# Patient Record
Sex: FEMALE | Race: WHITE | NOT HISPANIC OR LATINO | Employment: UNEMPLOYED | URBAN - NONMETROPOLITAN AREA
[De-identification: names, ages, dates, MRNs, and addresses within clinical notes are randomized per-mention and may not be internally consistent; named-entity substitution may affect disease eponyms.]

---

## 2018-07-25 ENCOUNTER — HISTORICAL (OUTPATIENT)
Dept: ADMINISTRATIVE | Facility: HOSPITAL | Age: 28
End: 2018-07-25

## 2021-12-21 LAB
B-HCG FREE SERPL-ACNC: NORMAL MIU/ML
BILIRUB SERPL-MCNC: NEGATIVE MG/DL
BLOOD URINE, POC: NORMAL
CLARITY, POC UA: CLEAR
COLOR, POC UA: YELLOW
GLUCOSE UR QL STRIP: NEGATIVE
KETONES UR QL STRIP: NEGATIVE
LEUKOCYTE EST, POC UA: NEGATIVE
NITRITE, POC UA: NEGATIVE
PH, POC UA: 6
POC BETA-HCG (QUAL): POSITIVE
PROTEIN, POC: NEGATIVE
SPECIFIC GRAVITY, POC UA: NORMAL
UROBILINOGEN, POC UA: NORMAL

## 2021-12-23 LAB — B-HCG FREE SERPL-ACNC: NORMAL MIU/ML

## 2021-12-27 LAB
CLARITY, POC UA: CLEAR
COLOR, POC UA: YELLOW
GLUCOSE UR QL STRIP: NEGATIVE
LEUKOCYTE EST, POC UA: NEGATIVE
NITRITE, POC UA: NEGATIVE
PROTEIN, POC: NEGATIVE

## 2021-12-28 LAB
HIV 1+2 AB+HIV1 P24 AG SERPL QL IA: NORMAL
RPR SER QL: NORMAL

## 2022-04-10 ENCOUNTER — HISTORICAL (OUTPATIENT)
Dept: ADMINISTRATIVE | Facility: HOSPITAL | Age: 32
End: 2022-04-10

## 2022-04-26 VITALS
WEIGHT: 157.63 LBS | SYSTOLIC BLOOD PRESSURE: 118 MMHG | DIASTOLIC BLOOD PRESSURE: 70 MMHG | HEIGHT: 65 IN | BODY MASS INDEX: 26.26 KG/M2

## 2022-04-30 ENCOUNTER — HISTORICAL (OUTPATIENT)
Dept: ADMINISTRATIVE | Facility: HOSPITAL | Age: 32
End: 2022-04-30

## 2022-05-03 NOTE — HISTORICAL [facility] CERNER
This is a historical note converted from Naheed. Formatting and pictures may have been removed.  Please reference Naheed for original formatting and attached multimedia. Chief Complaint  new ob  History of Present Illness  30yo WF  at 12w5d by LMP of 2021.  Last pap: 21 WNL. Reports mild to moderate back pain x1 week. Denies bleeding, pain.  LMP/EGA/REY  Gestational Age (EGA) and REY?? ? * Note: EGA calculated as of 2021  ?  REY:?2022???EGA*:?12 weeks 5 days ? ? ? ? ? ?Type:?Authoritative??????Method Date:?2021  ?  ?? ? ?Method:?Last Menstrual Period?(2021)  ?? ? ?Confirmation:?Confirmed  ?? ? ?Description:?--  ?? ? ?Comments:?--  ?? ? ?Entered by:?Dariela Spencer on 2021?  ?  Other REY Calculations for this Pregnancy:  ?? ? ?No additional REY calculations have been recorded for this pregnancy  Gynecological History  Last Menstrual Period: 21  Date of Last Pap Smear: 21  Menstrual Status Intake: Menarcheal  Age of Menarche: 13  STIs/STDs: No  Intermenstrual Bleeding: No  Abnormal Pap: No  Current Birth Control Method: BCPs  Dysmenorrhea: Moderate  HPV Vaccine: No  Flow: Moderate  Dyspareunia: No  Duration of Flow: 5  Postcoital Bleeding: No  Frequency of Cycle: 28  Dysuria: No  Additional GYN Information: Last pap: ASCUS (-) hpv.  Discharge OB: none  Breast CA Hx: No  Sexual Problems OB: none  Urinary Incontinence: none  Sexually Active: Yes  Review of Systems  General/Constitutional:  Fever?denies?.?  Skin:  Rash?denies.  Respiratory:  Cough?denies?. SOB?denies?. Wheezing?denies?.  Cardiovascular:  Chest pain?denies?. Dizziness?denies?.?Palpitations?denies?. Swelling in hands/feet?denies?.?  Gastrointestinal:  Abdominal pain?denies?. Constipation?denies?. Diarrhea?denies?. Heartburn?denies?. Nausea?denies?. Vomiting?denies?  Genitourinary:  Painful urination?denies.  Gynecologic:  Vaginal bleeding?denies?. Vaginal discharge?Normal. Leaking amniotic  fluid?denies. ?Contractions?denies?  Psychiatric:  Depressed mood?denies. Suicidal thoughts?denies.?  Physical Exam  Vitals & Measurements  T:?36.3? ?C (Temporal Artery)? BP:?118/68?  HT:?165.00?cm? WT:?74.400?kg? BMI:?27.33? LMP:?2021 00:00 CDT?  General Exam:  ?  General Appearance:?alert, in no acute distress, normal, well nourished.  Psych:  Orientation:?time, place, person.  Mood/Affect:?Normal?  Abdomen:  ?-?Soft.?Non-tender. ?No rebound tenderness or guardingNo masses. ? Liver?normal. ?Spleen?normal. ?No hernia palpable.  Pelvis:?  ?- Vulva:?Normal?  ?- Perianal skin:?Normal?  ?- Urethra:?Normal meatus. Q-tip:?Not performed?  ?- Vagina: ?NormalVaginal discharge present:?.?Cystocele:?Absent. ?Rectocele:?Absent?  ?- Cervix:?Normal. Cervical motion tenderness?Absent?  ?- Uterus: Size -?NormalMobile.?Non-tender.?  ?- Adnexal:?Normal  ?  TVUS:  GS: 11w1d  No YS?present  No FP present  Assessment/Plan  1.?Anembryonic pregnancy?O02.0  2.?11 weeks gestation of pregnancy?Z3A.11  Discussed findings of pelvic ultrasound today.  Beta HCG today, repeat 48 hours  Strict ER, Pain, Infection precautions given  ?  RTC Monday for Rescan   OB History  Pregnancy History???(1,0,0,1)?? ??  Pregnancy # 1  Baby 1?????????????Outcome Date:?2018????? Outcome:?Live Birth  ???Outcome or Result:?Vaginal  ???Gender:?Female????????Gest Age:?39 weeks 4 days ??????Wt:??3487 g  ???Hospital:?[facility]????????Rich Labor:?--  ???Spring Name:?--?????Babys Father:?--  ???Anesthesia Type:?Epidural  ??? Labor:?No  Problem List/Past Medical History  Ongoing  Anembryonic pregnancy  Pregnant  Historical  Pregnant  Procedure/Surgical History  Cystitis, interstitial   Medications  Blisovi FE 1/20 oral tablet, 1 tab(s), Oral, Daily,? ?Not taking  Blisovi FE 1/20 oral tablet, 1 tab(s), Oral, Daily, 3 refills,? 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 Highest education level: University degree(s)., 01/07/2021  Sexual  Sexually active: Yes. Number of current partners 1. Sexual orientation: Straight or heterosexual. Gender Identity Identifies as female. No, Other sexual concerns: No hx of STI., 12/21/2021  Substance Use  Never, 07/22/2020  Tobacco  Never (less than 100 in lifetime), N/A, 12/21/2021  Family History  Family history is negative  Immunizations  Vaccine Date Status   COVID-19 mRNA, LNP-S, PF - Moderna 08/23/2021 Recorded   meningococcal conjugate vaccine 05/07/2009 Recorded   influenza virus vaccine, inactivated 11/13/2007 Recorded   hepatitis B pediatric vaccine 10/10/2005 Recorded   hepatitis B pediatric vaccine 08/04/2005 Recorded   hepatitis B pediatric vaccine 06/13/2005 Recorded   poliovirus vaccine, inactivated 05/20/1996 Recorded   measles/mumps/rubella virus vaccine 05/20/1996 Recorded   diphtheria/pertussis, acel/tetanus ped 05/20/1996 Recorded   poliovirus vaccine, inactivated 01/25/1993 Recorded   measles/mumps/rubella virus vaccine 01/25/1993 Recorded   diphtheria/pertussis, acel/tetanus ped 01/25/1993 Recorded   diphtheria/pertussis, acel/tetanus ped 05/04/1992 Recorded   poliovirus vaccine, inactivated 02/17/1992 Recorded   diphtheria/pertussis, acel/tetanus ped 02/17/1992 Recorded   poliovirus vaccine, inactivated 1991 Recorded   diphtheria/pertussis, acel/tetanus ped 1991 Recorded   Health Maintenance  Health Maintenance  ???Pending?(in the next year)  ??? ??OverDue  ??? ? ? ?Alcohol Misuse Screening due??01/02/21??and every 1??year(s)  ??? ??Due?  ??? ? ? ?ADL Screening due??12/21/21??and every 1??year(s)  ??? ? ? ?Tetanus Vaccine due??12/21/21??and every 10??year(s)  ??? ??Due In Future?  ??? ? ? ?Obesity Screening not due  until??01/01/22??and every 1??year(s)  ???Satisfied?(in the past 1 year)  ??? ??Satisfied?  ??? ? ? ?Blood Pressure Screening on??12/21/21.??Satisfied by Dariela Spencer  ??? ? ? ?Body Mass Index Check on??12/21/21.??Satisfied by Dariela Spencer  ??? ? ? ?Cervical Cancer Screening on??01/07/21.??Satisfied by Brittaney Keith  ??? ? ? ?Depression Screening on??12/21/21.??Satisfied by Dariela Spencer  ??? ? ? ?Influenza Vaccine on??12/21/21.??Satisfied by Dariela Spencer  ??? ? ? ?Obesity Screening on??12/21/21.??Satisfied by Dariela Spencer  ?

## 2022-06-22 LAB
BENZODIAZEPINES: NORMAL
C TRACH DNA SPEC QL NAA+PROBE: NEGATIVE
CLARITHRO TITR SBT: NORMAL {TITER}
COCAINE (METAB.): NORMAL
MARIJUANA (THC) METABOLITE: NORMAL
METHADONE: NORMAL
N GONORRHOEA DNA SPEC QL NAA+PROBE: NORMAL
OPIATES UR QL SCN: NORMAL
URINE CULTURE, ROUTINE: NEGATIVE

## 2022-07-06 LAB
ABO + RH BLD: NORMAL
EXT MATERNIT21: NORMAL
HBV SURFACE AG SERPL QL IA: NONREACTIVE
HCT VFR BLD AUTO: 33 % (ref 36–46)
HCV AB SERPL QL IA: NEGATIVE
HGB BLD-MCNC: 11 G/DL (ref 12–16)
HIV-1 AND HIV-2 ANTIBODIES: NONREACTIVE
INDIRECT COOMBS: NEGATIVE
PLATELET # BLD AUTO: 243 K/UL (ref 150–399)
RUBELLA IMMUNE STATUS: NORMAL
T PALLIDUM AB SER QL: NONREACTIVE

## 2022-08-24 LAB — QUAD SCREEN: NORMAL

## 2022-09-16 ENCOUNTER — HISTORICAL (OUTPATIENT)
Dept: ADMINISTRATIVE | Facility: HOSPITAL | Age: 32
End: 2022-09-16

## 2022-11-18 LAB
GLUCOSE SERPL-MCNC: 149 MG/DL
HCT VFR BLD AUTO: 32.9 % (ref 36–46)
HGB BLD-MCNC: 10.9 G/DL (ref 12–16)
MCV RBC AUTO: 92.7 FL (ref 82–108)
PLATELET # BLD AUTO: 221 K/UL (ref 150–399)
T PALLIDUM AB SER QL: NONREACTIVE

## 2022-11-23 LAB
GLUCOSE, 1 HOUR: 129 MG/DL
GLUCOSE, 2 HOUR: 97 MG/DL
GLUCOSE, 3 HOUR: 95 MG/DL
GLUCOSE, FASTING: 82 MG/DL

## 2023-01-03 LAB — STREP B PCR (OHS): NOT DETECTED

## 2023-01-18 VITALS
BODY MASS INDEX: 31.96 KG/M2 | WEIGHT: 191.81 LBS | DIASTOLIC BLOOD PRESSURE: 68 MMHG | SYSTOLIC BLOOD PRESSURE: 100 MMHG

## 2023-01-18 DIAGNOSIS — Z34.90 PREGNANCY, UNSPECIFIED GESTATIONAL AGE: ICD-10-CM

## 2023-01-18 DIAGNOSIS — Z34.90 NORMAL INTRAUTERINE PREGNANCY, ANTEPARTUM: ICD-10-CM

## 2023-01-18 LAB — PRENATAL STREP B CULTURE: NEGATIVE

## 2023-01-24 ENCOUNTER — DOCUMENTATION ONLY (OUTPATIENT)
Dept: OBSTETRICS AND GYNECOLOGY | Facility: CLINIC | Age: 33
End: 2023-01-24

## 2023-01-24 ENCOUNTER — ROUTINE PRENATAL (OUTPATIENT)
Dept: OBSTETRICS AND GYNECOLOGY | Facility: CLINIC | Age: 33
End: 2023-01-24
Payer: COMMERCIAL

## 2023-01-24 VITALS
HEART RATE: 82 BPM | DIASTOLIC BLOOD PRESSURE: 64 MMHG | BODY MASS INDEX: 32.8 KG/M2 | WEIGHT: 196.88 LBS | SYSTOLIC BLOOD PRESSURE: 112 MMHG

## 2023-01-24 DIAGNOSIS — Z34.90 PREGNANCY, UNSPECIFIED GESTATIONAL AGE: Primary | ICD-10-CM

## 2023-01-24 LAB
BILIRUB SERPL-MCNC: NEGATIVE MG/DL
BLOOD URINE, POC: NORMAL
CLARITY, POC UA: CLEAR
COLOR, POC UA: NORMAL
GLUCOSE UR QL STRIP: NEGATIVE
KETONES UR QL STRIP: NORMAL
LEUKOCYTE ESTERASE URINE, POC: NORMAL
NITRITE, POC UA: NEGATIVE
PH, POC UA: 7
PROTEIN, POC: NEGATIVE
SPECIFIC GRAVITY, POC UA: 1.02
URINE CULTURE: NO GROWTH
UROBILINOGEN, POC UA: 0.2

## 2023-01-24 PROCEDURE — 99499 NO LOS: ICD-10-PCS | Mod: ,,, | Performed by: OBSTETRICS & GYNECOLOGY

## 2023-01-24 PROCEDURE — 99499 UNLISTED E&M SERVICE: CPT | Mod: ,,, | Performed by: OBSTETRICS & GYNECOLOGY

## 2023-01-24 NOTE — PROGRESS NOTES
PRESENTS TO CLINIC 39 WEEKS FOR OB VISIT. C/O INCREASE PELVIC PRESSURE AND IRREGULAR CONTRACTIONS

## 2023-01-24 NOTE — PROGRESS NOTES
HPI  Hayde Mejía is a 32 y.o.   40w2d here for Routine Prenatal Visit (39 weeks for OB visit)  C/O PELVIC PRESSURE, NO VB, ROM, OR PIH SXS. ACTIVE CROW Rosado allergies, medications, history, and problem list were updated as appropriate.    ROS:  A comprehensive review of symptoms was completed and negative except as noted above.    Physical Exam:  /64   Pulse 82   Wt 89.3 kg (196 lb 13.9 oz)   LMP 2022   BMI 32.80 kg/m²     Gen: No distress  Abdomen: Gravid, non-tender  Extremities: No edema    Fetal Heart Rate: 126  Movement: Present  Presentation: Vertex  Dilation: 3.5  Effacement (%): 70  Station: -2    ASSESSMENT/PLAN:  1. Pregnancy, unspecified gestational age  -     POCT URINE DIPSTICK WITHOUT MICROSCOPE    RTC IN 2 DAYS FOR F/U     Labor unit, Kick Counts, and Pre-eclampsia given    Follow Up:  Follow up in about 2 days (around 2023) for OB F/U.

## 2023-01-26 ENCOUNTER — PROCEDURE VISIT (OUTPATIENT)
Dept: OBSTETRICS AND GYNECOLOGY | Facility: CLINIC | Age: 33
End: 2023-01-26
Payer: COMMERCIAL

## 2023-01-26 VITALS
HEART RATE: 104 BPM | HEIGHT: 65 IN | SYSTOLIC BLOOD PRESSURE: 108 MMHG | DIASTOLIC BLOOD PRESSURE: 78 MMHG | RESPIRATION RATE: 18 BRPM | WEIGHT: 199.06 LBS | BODY MASS INDEX: 33.16 KG/M2

## 2023-01-26 DIAGNOSIS — Z34.83 ENCOUNTER FOR SUPERVISION OF OTHER NORMAL PREGNANCY IN THIRD TRIMESTER: Primary | ICD-10-CM

## 2023-01-26 DIAGNOSIS — Z34.90 PREGNANCY, UNSPECIFIED GESTATIONAL AGE: ICD-10-CM

## 2023-01-26 LAB
BILIRUB SERPL-MCNC: NEGATIVE MG/DL
BLOOD URINE, POC: NORMAL
CLARITY, POC UA: CLEAR
COLOR, POC UA: YELLOW
GLUCOSE UR QL STRIP: NEGATIVE
KETONES UR QL STRIP: NEGATIVE
LEUKOCYTE ESTERASE URINE, POC: NORMAL
NITRITE, POC UA: NEGATIVE
PH, POC UA: 7
PROTEIN, POC: NEGATIVE
SPECIFIC GRAVITY, POC UA: 1.02
UROBILINOGEN, POC UA: 0.2

## 2023-01-26 PROCEDURE — 0502F SUBSEQUENT PRENATAL CARE: CPT | Mod: ,,, | Performed by: OBSTETRICS & GYNECOLOGY

## 2023-01-26 PROCEDURE — 0502F PR SUBSEQUENT PRENATAL CARE: ICD-10-PCS | Mod: ,,, | Performed by: OBSTETRICS & GYNECOLOGY

## 2023-01-26 NOTE — H&P (VIEW-ONLY)
"HPI  Hayde Mejía is a 32 y.o.   39w2d here for Routine Prenatal Visit (Presents to clinic 39w2d, no c/o)  HAVING SOME PRESSURE AND IRREGULAR CONTRACTIONS. DENIES VB, ROM, PIH SXS. REPORTS ACTIVE FM.      Hayde's allergies, medications, history, and problem list were updated as appropriate.    ROS:  A comprehensive review of symptoms was completed and negative except as noted above.    Physical Exam:  /78   Pulse 104   Resp 18   Ht 5' 5" (1.651 m)   Wt 90.3 kg (199 lb 1.2 oz)   LMP 2022   BMI 33.13 kg/m²     Gen: No distress  Abdomen: Gravid, non-tender  Extremities: No edema  PELVIC: 4.5/80/-2/VTX/OP    U/S: 37W6D, EFW=6#9OZ, SHAKILA=12CM, VTX, OP    ASSESSMENT/PLAN:  1. Encounter for supervision of other normal pregnancy in third trimester    2. Pregnancy, unspecified gestational age  -     POCT URINE DIPSTICK WITHOUT MICROSCOPE  -     Cancel: US OB/GYN In Clinic Procedure (Non Viewpoint)- Today  -     Cancel: US OB/GYN Procedure (Viewpoint) - Extended List - Future    ADMIT FOR LABOR [facility]ATATION AT [facility] ON 23 AT 8AM.      Recent Results (from the past 24 hour(s))   POCT URINE DIPSTICK WITHOUT MICROSCOPE    Collection Time: 23 10:27 AM   Result Value Ref Range    Color, UA Yellow     pH, UA 7.0     WBC, UA SMALL     Nitrite, UA NEGATIVE     Protein, POC NEGATIVE     Glucose, UA NEGATIVE     Ketones, UA NEGATIVE     Urobilinogen, UA 0.2     Bilirubin, POC NEGATIVE     Blood, UA TRACE     Clarity, UA Clear     Spec Grav UA 1.020        Labor unit, Kick Counts, and Pre-eclampsia given    Follow Up:  Follow up in about 4 weeks (around 2023) for POST PARTUM F/U.       "

## 2023-01-26 NOTE — PROGRESS NOTES
"HPI  Hayde Mejía is a 32 y.o.   39w2d here for Routine Prenatal Visit (Presents to clinic 39w2d, no c/o)  HAVING SOME PRESSURE AND IRREGULAR CONTRACTIONS. DENIES VB, ROM, PIH SXS. REPORTS ACTIVE FM.      Hayde's allergies, medications, history, and problem list were updated as appropriate.    ROS:  A comprehensive review of symptoms was completed and negative except as noted above.    Physical Exam:  /78   Pulse 104   Resp 18   Ht 5' 5" (1.651 m)   Wt 90.3 kg (199 lb 1.2 oz)   LMP 2022   BMI 33.13 kg/m²     Gen: No distress  Abdomen: Gravid, non-tender  Extremities: No edema  PELVIC: 4.5/80/-2/VTX/OP    U/S: 37W6D, EFW=6#9OZ, SHAKILA=12CM, VTX, OP    ASSESSMENT/PLAN:  1. Encounter for supervision of other normal pregnancy in third trimester    2. Pregnancy, unspecified gestational age  -     POCT URINE DIPSTICK WITHOUT MICROSCOPE  -     Cancel: US OB/GYN In Clinic Procedure (Non Viewpoint)- Today  -     Cancel: US OB/GYN Procedure (Viewpoint) - Extended List - Future    ADMIT FOR LABOR AUGMENATATION AT [facility] ON 23 AT 8AM.      Recent Results (from the past 24 hour(s))   POCT URINE DIPSTICK WITHOUT MICROSCOPE    Collection Time: 23 10:27 AM   Result Value Ref Range    Color, UA Yellow     pH, UA 7.0     WBC, UA SMALL     Nitrite, UA NEGATIVE     Protein, POC NEGATIVE     Glucose, UA NEGATIVE     Ketones, UA NEGATIVE     Urobilinogen, UA 0.2     Bilirubin, POC NEGATIVE     Blood, UA TRACE     Clarity, UA Clear     Spec Grav UA 1.020        Labor unit, Kick Counts, and Pre-eclampsia given    Follow Up:  Follow up in about 4 weeks (around 2023) for POST PARTUM F/U.       "

## 2023-01-27 ENCOUNTER — HOSPITAL ENCOUNTER (INPATIENT)
Facility: HOSPITAL | Age: 33
LOS: 1 days | Discharge: HOME | End: 2023-01-28
Attending: OBSTETRICS & GYNECOLOGY | Admitting: OBSTETRICS & GYNECOLOGY
Payer: COMMERCIAL

## 2023-01-27 ENCOUNTER — ANESTHESIA (OUTPATIENT)
Dept: OBSTETRICS AND GYNECOLOGY | Facility: HOSPITAL | Age: 33
End: 2023-01-27
Payer: COMMERCIAL

## 2023-01-27 ENCOUNTER — ANESTHESIA EVENT (OUTPATIENT)
Dept: OBSTETRICS AND GYNECOLOGY | Facility: HOSPITAL | Age: 33
End: 2023-01-27
Payer: COMMERCIAL

## 2023-01-27 DIAGNOSIS — Z34.90 NORMAL PREGNANCY: ICD-10-CM

## 2023-01-27 DIAGNOSIS — Z34.93 NORMAL PREGNANCY IN THIRD TRIMESTER: ICD-10-CM

## 2023-01-27 LAB
ABO AND RH: NORMAL
ABORH RETYPE: NORMAL
ALBUMIN SERPL-MCNC: 3.2 G/DL (ref 3.4–5)
ALBUMIN/GLOB SERPL: 1.1 RATIO
ALP SERPL-CCNC: 120 UNIT/L (ref 50–144)
ALT SERPL-CCNC: 16 UNIT/L (ref 1–45)
ANTIBODY SCREEN: NORMAL
AST SERPL-CCNC: 23 UNIT/L (ref 14–36)
BASOPHILS # BLD AUTO: 0.01 X10(3)/MCL (ref 0.01–0.08)
BASOPHILS NFR BLD AUTO: 0.1 % (ref 0.1–1.2)
BILIRUBIN DIRECT+TOT PNL SERPL-MCNC: 0.3 MG/DL (ref 0–1)
BUN SERPL-MCNC: 7 MG/DL (ref 7–20)
CALCIUM SERPL-MCNC: 8.8 MG/DL (ref 8.4–10.2)
CHLORIDE SERPL-SCNC: 107 MMOL/L (ref 98–110)
CO2 SERPL-SCNC: 23 MMOL/L (ref 21–32)
CREAT SERPL-MCNC: 0.48 MG/DL (ref 0.66–1.25)
EOSINOPHIL # BLD AUTO: 0.04 X10(3)/MCL (ref 0.04–0.36)
EOSINOPHIL NFR BLD AUTO: 0.6 % (ref 0.7–7)
ERYTHROCYTE [DISTWIDTH] IN BLOOD BY AUTOMATED COUNT: 12.3 % (ref 11–14.5)
GFR SERPLBLD CREATININE-BSD FMLA CKD-EPI: >90 MLS/MIN/1.73/M2
GLOBULIN SER-MCNC: 2.9 GM/DL (ref 2–3.9)
GLUCOSE SERPL-MCNC: 86 MG/DL (ref 70–115)
HBV SURFACE AG SERPL QL IA: NEGATIVE
HCT VFR BLD AUTO: 31.6 % (ref 36–48)
HGB BLD-MCNC: 10.5 GM/DL (ref 11.8–16)
HIV 1+2 AB+HIV1 P24 AG SERPL QL IA: NONREACTIVE
IMM GRANULOCYTES # BLD AUTO: 0.05 X10(3)/MCL (ref 0–0.03)
IMM GRANULOCYTES NFR BLD AUTO: 0.7 % (ref 0–0.5)
LYMPHOCYTES # BLD AUTO: 1.44 X10(3)/MCL (ref 1.16–3.74)
LYMPHOCYTES NFR BLD AUTO: 20.4 % (ref 20–55)
MCH RBC QN AUTO: 29.4 PG (ref 27–34)
MCV RBC AUTO: 88.5 FL (ref 79–99)
MEAN CELL HEMOGLOBIN CONCENTRATION (OHS) G/DL: 33.2 G/DL (ref 31–37)
MONOCYTES # BLD AUTO: 0.48 X10(3)/MCL (ref 0.24–0.36)
MONOCYTES NFR BLD AUTO: 6.8 % (ref 4.7–12.5)
NEUTROPHILS # BLD AUTO: 5.03 X10(3)/MCL (ref 1.56–6.13)
NEUTROPHILS NFR BLD AUTO: 71.4 % (ref 37–73)
NRBC BLD AUTO-RTO: 0 % (ref 0–1)
PLATELET # BLD AUTO: 191 X10(3)/MCL (ref 140–371)
PMV BLD AUTO: 10.1 FL (ref 9.4–12.4)
POTASSIUM SERPL-SCNC: 3.6 MMOL/L (ref 3.5–5.1)
PROT SERPL-MCNC: 6.1 GM/DL (ref 6.3–8.2)
RBC # BLD AUTO: 3.57 X10(6)/MCL (ref 4–5.1)
SODIUM SERPL-SCNC: 136 MMOL/L (ref 135–145)
T PALLIDUM AB SER QL: NONREACTIVE
WBC # SPEC AUTO: 7.1 X10(3)/MCL (ref 4–11.5)

## 2023-01-27 PROCEDURE — 62326 NJX INTERLAMINAR LMBR/SAC: CPT | Performed by: NURSE ANESTHETIST, CERTIFIED REGISTERED

## 2023-01-27 PROCEDURE — 87389 HIV-1 AG W/HIV-1&-2 AB AG IA: CPT | Performed by: OBSTETRICS & GYNECOLOGY

## 2023-01-27 PROCEDURE — 25000003 PHARM REV CODE 250: Performed by: NURSE ANESTHETIST, CERTIFIED REGISTERED

## 2023-01-27 PROCEDURE — 36415 COLL VENOUS BLD VENIPUNCTURE: CPT | Performed by: OBSTETRICS & GYNECOLOGY

## 2023-01-27 PROCEDURE — 63600175 PHARM REV CODE 636 W HCPCS: Performed by: OBSTETRICS & GYNECOLOGY

## 2023-01-27 PROCEDURE — 37000008 HC ANESTHESIA 1ST 15 MINUTES

## 2023-01-27 PROCEDURE — 72100002 HC LABOR CARE, 1ST 8 HOURS

## 2023-01-27 PROCEDURE — 72200005 HC VAGINAL DELIVERY LEVEL II

## 2023-01-27 PROCEDURE — 86900 BLOOD TYPING SEROLOGIC ABO: CPT | Performed by: OBSTETRICS & GYNECOLOGY

## 2023-01-27 PROCEDURE — 25000003 PHARM REV CODE 250: Performed by: OBSTETRICS & GYNECOLOGY

## 2023-01-27 PROCEDURE — 80053 COMPREHEN METABOLIC PANEL: CPT | Performed by: OBSTETRICS & GYNECOLOGY

## 2023-01-27 PROCEDURE — 86780 TREPONEMA PALLIDUM: CPT | Performed by: OBSTETRICS & GYNECOLOGY

## 2023-01-27 PROCEDURE — 59400 OBSTETRICAL CARE: CPT | Mod: GB,,, | Performed by: OBSTETRICS & GYNECOLOGY

## 2023-01-27 PROCEDURE — 85025 COMPLETE CBC W/AUTO DIFF WBC: CPT | Performed by: OBSTETRICS & GYNECOLOGY

## 2023-01-27 PROCEDURE — 59400 PR FULL ROUT OBSTE CARE,VAGINAL DELIV: ICD-10-PCS | Mod: GB,,, | Performed by: OBSTETRICS & GYNECOLOGY

## 2023-01-27 PROCEDURE — 11000001 HC ACUTE MED/SURG PRIVATE ROOM

## 2023-01-27 PROCEDURE — 59400 PRA FULL ROUT OBSTE CARE,VAGINAL DELIV: ICD-10-PCS | Mod: QZ,,, | Performed by: NURSE ANESTHETIST, CERTIFIED REGISTERED

## 2023-01-27 PROCEDURE — 37000009 HC ANESTHESIA EA ADD 15 MINS

## 2023-01-27 PROCEDURE — 59025 FETAL NON-STRESS TEST: CPT

## 2023-01-27 PROCEDURE — 59400 OBSTETRICAL CARE: CPT | Mod: QZ,,, | Performed by: NURSE ANESTHETIST, CERTIFIED REGISTERED

## 2023-01-27 PROCEDURE — 87340 HEPATITIS B SURFACE AG IA: CPT | Performed by: OBSTETRICS & GYNECOLOGY

## 2023-01-27 RX ORDER — DIPHENHYDRAMINE HCL 25 MG
25 CAPSULE ORAL EVERY 4 HOURS PRN
Status: DISCONTINUED | OUTPATIENT
Start: 2023-01-27 | End: 2023-01-28 | Stop reason: HOSPADM

## 2023-01-27 RX ORDER — ONDANSETRON 4 MG/1
8 TABLET, ORALLY DISINTEGRATING ORAL EVERY 8 HOURS PRN
Status: DISCONTINUED | OUTPATIENT
Start: 2023-01-27 | End: 2023-01-27

## 2023-01-27 RX ORDER — OXYTOCIN/RINGER'S LACTATE 30/500 ML
0-30 PLASTIC BAG, INJECTION (ML) INTRAVENOUS CONTINUOUS
Status: DISCONTINUED | OUTPATIENT
Start: 2023-01-27 | End: 2023-01-27

## 2023-01-27 RX ORDER — MISOPROSTOL 100 UG/1
800 TABLET ORAL ONCE AS NEEDED
Status: DISCONTINUED | OUTPATIENT
Start: 2023-01-27 | End: 2023-01-28 | Stop reason: HOSPADM

## 2023-01-27 RX ORDER — SIMETHICONE 80 MG
1 TABLET,CHEWABLE ORAL EVERY 6 HOURS PRN
Status: DISCONTINUED | OUTPATIENT
Start: 2023-01-27 | End: 2023-01-28 | Stop reason: HOSPADM

## 2023-01-27 RX ORDER — TRANEXAMIC ACID 10 MG/ML
1000 INJECTION, SOLUTION INTRAVENOUS ONCE AS NEEDED
Status: DISCONTINUED | OUTPATIENT
Start: 2023-01-27 | End: 2023-01-28 | Stop reason: HOSPADM

## 2023-01-27 RX ORDER — CARBOPROST TROMETHAMINE 250 UG/ML
250 INJECTION, SOLUTION INTRAMUSCULAR
Status: DISCONTINUED | OUTPATIENT
Start: 2023-01-27 | End: 2023-01-28 | Stop reason: HOSPADM

## 2023-01-27 RX ORDER — METHYLERGONOVINE MALEATE 0.2 MG/ML
200 INJECTION INTRAVENOUS
Status: DISCONTINUED | OUTPATIENT
Start: 2023-01-27 | End: 2023-01-28 | Stop reason: HOSPADM

## 2023-01-27 RX ORDER — SODIUM CHLORIDE 0.9 % (FLUSH) 0.9 %
10 SYRINGE (ML) INJECTION
Status: DISCONTINUED | OUTPATIENT
Start: 2023-01-27 | End: 2023-01-28 | Stop reason: HOSPADM

## 2023-01-27 RX ORDER — ONDANSETRON 4 MG/1
8 TABLET, ORALLY DISINTEGRATING ORAL EVERY 8 HOURS PRN
Status: DISCONTINUED | OUTPATIENT
Start: 2023-01-27 | End: 2023-01-28 | Stop reason: HOSPADM

## 2023-01-27 RX ORDER — OXYTOCIN/RINGER'S LACTATE 30/500 ML
95 PLASTIC BAG, INJECTION (ML) INTRAVENOUS ONCE
Status: DISCONTINUED | OUTPATIENT
Start: 2023-01-27 | End: 2023-01-28

## 2023-01-27 RX ORDER — BUTORPHANOL TARTRATE 1 MG/ML
1 INJECTION INTRAMUSCULAR; INTRAVENOUS
Status: DISCONTINUED | OUTPATIENT
Start: 2023-01-27 | End: 2023-01-27

## 2023-01-27 RX ORDER — LIDOCAINE HYDROCHLORIDE 10 MG/ML
10 INJECTION INFILTRATION; PERINEURAL ONCE AS NEEDED
Status: DISCONTINUED | OUTPATIENT
Start: 2023-01-27 | End: 2023-01-27

## 2023-01-27 RX ORDER — OXYTOCIN/RINGER'S LACTATE 30/500 ML
334 PLASTIC BAG, INJECTION (ML) INTRAVENOUS ONCE AS NEEDED
Status: DISCONTINUED | OUTPATIENT
Start: 2023-01-27 | End: 2023-01-27

## 2023-01-27 RX ORDER — OXYTOCIN/RINGER'S LACTATE 30/500 ML
334 PLASTIC BAG, INJECTION (ML) INTRAVENOUS ONCE
Status: DISCONTINUED | OUTPATIENT
Start: 2023-01-27 | End: 2023-01-27

## 2023-01-27 RX ORDER — OXYTOCIN/RINGER'S LACTATE 30/500 ML
95 PLASTIC BAG, INJECTION (ML) INTRAVENOUS ONCE
Status: DISCONTINUED | OUTPATIENT
Start: 2023-01-27 | End: 2023-01-27

## 2023-01-27 RX ORDER — SODIUM CHLORIDE, SODIUM LACTATE, POTASSIUM CHLORIDE, CALCIUM CHLORIDE 600; 310; 30; 20 MG/100ML; MG/100ML; MG/100ML; MG/100ML
INJECTION, SOLUTION INTRAVENOUS CONTINUOUS
Status: DISCONTINUED | OUTPATIENT
Start: 2023-01-27 | End: 2023-01-27

## 2023-01-27 RX ORDER — MISOPROSTOL 100 UG/1
800 TABLET ORAL
Status: DISCONTINUED | OUTPATIENT
Start: 2023-01-27 | End: 2023-01-28 | Stop reason: HOSPADM

## 2023-01-27 RX ORDER — SIMETHICONE 80 MG
1 TABLET,CHEWABLE ORAL 4 TIMES DAILY PRN
Status: DISCONTINUED | OUTPATIENT
Start: 2023-01-27 | End: 2023-01-27

## 2023-01-27 RX ORDER — DOCUSATE SODIUM 100 MG/1
200 CAPSULE, LIQUID FILLED ORAL 2 TIMES DAILY PRN
Status: DISCONTINUED | OUTPATIENT
Start: 2023-01-27 | End: 2023-01-28 | Stop reason: HOSPADM

## 2023-01-27 RX ORDER — OXYTOCIN/RINGER'S LACTATE 30/500 ML
95 PLASTIC BAG, INJECTION (ML) INTRAVENOUS ONCE AS NEEDED
Status: DISCONTINUED | OUTPATIENT
Start: 2023-01-27 | End: 2023-01-27

## 2023-01-27 RX ORDER — ACETAMINOPHEN 325 MG/1
650 TABLET ORAL EVERY 6 HOURS PRN
Status: DISCONTINUED | OUTPATIENT
Start: 2023-01-27 | End: 2023-01-28 | Stop reason: HOSPADM

## 2023-01-27 RX ORDER — HYDROCODONE BITARTRATE AND ACETAMINOPHEN 5; 325 MG/1; MG/1
1 TABLET ORAL EVERY 4 HOURS PRN
Status: DISCONTINUED | OUTPATIENT
Start: 2023-01-27 | End: 2023-01-28 | Stop reason: HOSPADM

## 2023-01-27 RX ORDER — PROCHLORPERAZINE EDISYLATE 5 MG/ML
5 INJECTION INTRAMUSCULAR; INTRAVENOUS EVERY 6 HOURS PRN
Status: DISCONTINUED | OUTPATIENT
Start: 2023-01-27 | End: 2023-01-27

## 2023-01-27 RX ORDER — OXYTOCIN 10 [USP'U]/ML
10 INJECTION, SOLUTION INTRAMUSCULAR; INTRAVENOUS ONCE AS NEEDED
Status: DISCONTINUED | OUTPATIENT
Start: 2023-01-27 | End: 2023-01-28 | Stop reason: HOSPADM

## 2023-01-27 RX ORDER — DIPHENOXYLATE HYDROCHLORIDE AND ATROPINE SULFATE 2.5; .025 MG/1; MG/1
1 TABLET ORAL 4 TIMES DAILY PRN
Status: DISCONTINUED | OUTPATIENT
Start: 2023-01-27 | End: 2023-01-27

## 2023-01-27 RX ORDER — IBUPROFEN 600 MG/1
600 TABLET ORAL EVERY 6 HOURS PRN
Status: DISCONTINUED | OUTPATIENT
Start: 2023-01-27 | End: 2023-01-28 | Stop reason: HOSPADM

## 2023-01-27 RX ORDER — SODIUM CHLORIDE 9 MG/ML
INJECTION, SOLUTION INTRAVENOUS
Status: DISCONTINUED | OUTPATIENT
Start: 2023-01-27 | End: 2023-01-27

## 2023-01-27 RX ORDER — LIDOCAINE HCL/EPINEPHRINE/PF 2%-1:200K
VIAL (ML) INJECTION
Status: DISCONTINUED | OUTPATIENT
Start: 2023-01-27 | End: 2023-01-27

## 2023-01-27 RX ORDER — HYDROCORTISONE 25 MG/G
CREAM TOPICAL 3 TIMES DAILY PRN
Status: DISCONTINUED | OUTPATIENT
Start: 2023-01-27 | End: 2023-01-28 | Stop reason: HOSPADM

## 2023-01-27 RX ORDER — TRANEXAMIC ACID 10 MG/ML
1000 INJECTION, SOLUTION INTRAVENOUS ONCE AS NEEDED
Status: DISCONTINUED | OUTPATIENT
Start: 2023-01-27 | End: 2023-01-27

## 2023-01-27 RX ORDER — PRENATAL WITH FERROUS FUM AND FOLIC ACID 3080; 920; 120; 400; 22; 1.84; 3; 20; 10; 1; 12; 200; 27; 25; 2 [IU]/1; [IU]/1; MG/1; [IU]/1; MG/1; MG/1; MG/1; MG/1; MG/1; MG/1; UG/1; MG/1; MG/1; MG/1; MG/1
1 TABLET ORAL DAILY
Status: DISCONTINUED | OUTPATIENT
Start: 2023-01-28 | End: 2023-01-28 | Stop reason: HOSPADM

## 2023-01-27 RX ORDER — CALCIUM CARBONATE 200(500)MG
500 TABLET,CHEWABLE ORAL 3 TIMES DAILY PRN
Status: DISCONTINUED | OUTPATIENT
Start: 2023-01-27 | End: 2023-01-27

## 2023-01-27 RX ADMIN — SODIUM CHLORIDE, POTASSIUM CHLORIDE, SODIUM LACTATE AND CALCIUM CHLORIDE: 600; 310; 30; 20 INJECTION, SOLUTION INTRAVENOUS at 10:01

## 2023-01-27 RX ADMIN — LIDOCAINE HYDROCHLORIDE AND EPINEPHRINE 3 ML: 20; 5 INJECTION, SOLUTION EPIDURAL; INFILTRATION; INTRACAUDAL; PERINEURAL at 04:01

## 2023-01-27 RX ADMIN — IBUPROFEN 600 MG: 600 TABLET, FILM COATED ORAL at 08:01

## 2023-01-27 RX ADMIN — BENZOCAINE AND LEVOMENTHOL: 200; 5 SPRAY TOPICAL at 08:01

## 2023-01-27 RX ADMIN — Medication 4 MILLI-UNITS/MIN: at 02:01

## 2023-01-27 RX ADMIN — SODIUM CHLORIDE, POTASSIUM CHLORIDE, SODIUM LACTATE AND CALCIUM CHLORIDE 1000 ML: 600; 310; 30; 20 INJECTION, SOLUTION INTRAVENOUS at 09:01

## 2023-01-27 RX ADMIN — HYDROCODONE BITARTRATE AND ACETAMINOPHEN 1 TABLET: 5; 325 TABLET ORAL at 10:01

## 2023-01-27 NOTE — ANESTHESIA PROCEDURE NOTES
Epidural    Patient location during procedure: OB   Reason for block: primary anesthetic   Reason for block: at surgeon's request, post-op pain management  Diagnosis: Active Labor   Start time: 1/27/2023 4:40 PM  Timeout: 1/27/2023 4:30 PM  End time: 1/27/2023 5:00 PM    Staffing  Performing Provider: Hussain De Leon CRNA  Authorizing Provider: Hussain De Leon CRNA        Preanesthetic Checklist  Completed: patient identified, IV checked, site marked, surgical consent, monitors and equipment checked, pre-op evaluation, timeout performed, anesthesia consent given, hand hygiene performed and patient being monitored  Preparation  Patient position: sitting  Prep: ChloraPrep  Reason for block: primary anesthetic   Epidural  Skin Anesthetic: lidocaine 1%  Administration type: single shot  Approach: midline  Interspace: L3-4    Injection technique: GRACIE air  Block type: caudal.  Needle and Epidural Catheter  Needle type: Tuohy   Needle gauge: 18  Needle length: 5.0 inches  Catheter type: multi-orifice  Catheter size: 20 G  Insertion Attempts: 1  Test dose: 3 mL of lidocaine 1.5% with Epi 1-to-200,000  Additional Documentation: incremental injection, negative aspiration for heme and CSF and no paresthesia on injection  Needle localization: anatomical landmarks  Assessment  Ease of block: easy  Patient's tolerance of the procedure: comfortable throughout block No inadvertent dural puncture with Tuohy.  Dural puncture not performed with spinal needle

## 2023-01-27 NOTE — ANESTHESIA PREPROCEDURE EVALUATION
01/27/2023  Hayde Mejía is a 32 y.o., female.      Pre-op Assessment    I have reviewed the Patient Summary Reports.     I have reviewed the Nursing Notes. I have reviewed the NPO Status.   I have reviewed the Medications.     Review of Systems  Anesthesia Hx:  No problems with previous Anesthesia  Denies Family Hx of Anesthesia complications.   Denies Personal Hx of Anesthesia complications.   Social:  Social Alcohol Use    Hematology/Oncology:  Hematology Normal   Oncology Normal     EENT/Dental:EENT/Dental Normal   Cardiovascular:  Cardiovascular Normal Exercise tolerance: good     Pulmonary:  Pulmonary Normal    Renal/:  Renal/ Normal     Hepatic/GI:  Hepatic/GI Normal    Musculoskeletal:  Musculoskeletal Normal    Neurological:  Neurology Normal    Endocrine:  Endocrine Normal    Dermatological:  Skin Normal    Psych:  Psychiatric Normal              Anesthesia Plan  Type of Anesthesia, risks & benefits discussed:    Anesthesia Type: Epidural  Intra-op Monitoring Plan: Standard ASA Monitors  Post Op Pain Control Plan: epidural analgesia  Informed Consent: Informed consent signed with the Patient and all parties understand the risks and agree with anesthesia plan.  All questions answered. Patient consented to blood products? Yes  ASA Score: 2  Day of Surgery Review of History & Physical: H&P Update referred to the surgeon/provider.I have interviewed and examined the patient. I have reviewed the patient's H&P dated: There are no significant changes.     Ready For Surgery From Anesthesia Perspective.     .

## 2023-01-28 VITALS
DIASTOLIC BLOOD PRESSURE: 88 MMHG | TEMPERATURE: 97 F | RESPIRATION RATE: 16 BRPM | HEART RATE: 91 BPM | SYSTOLIC BLOOD PRESSURE: 126 MMHG

## 2023-01-28 LAB
BASOPHILS # BLD AUTO: 0.02 X10(3)/MCL (ref 0.01–0.08)
BASOPHILS NFR BLD AUTO: 0.2 % (ref 0.1–1.2)
EOSINOPHIL # BLD AUTO: 0.04 X10(3)/MCL (ref 0.04–0.36)
EOSINOPHIL NFR BLD AUTO: 0.5 % (ref 0.7–7)
ERYTHROCYTE [DISTWIDTH] IN BLOOD BY AUTOMATED COUNT: 12.5 % (ref 11–14.5)
HCT VFR BLD AUTO: 29.1 % (ref 36–48)
HGB BLD-MCNC: 9.9 GM/DL (ref 11.8–16)
IMM GRANULOCYTES # BLD AUTO: 0.08 X10(3)/MCL (ref 0–0.03)
IMM GRANULOCYTES NFR BLD AUTO: 0.9 % (ref 0–0.5)
LYMPHOCYTES # BLD AUTO: 1.56 X10(3)/MCL (ref 1.16–3.74)
LYMPHOCYTES NFR BLD AUTO: 17.8 % (ref 20–55)
MCH RBC QN AUTO: 30.5 PG (ref 27–34)
MCV RBC AUTO: 89.5 FL (ref 79–99)
MEAN CELL HEMOGLOBIN CONCENTRATION (OHS) G/DL: 34 G/DL (ref 31–37)
MONOCYTES # BLD AUTO: 0.44 X10(3)/MCL (ref 0.24–0.36)
MONOCYTES NFR BLD AUTO: 5 % (ref 4.7–12.5)
NEUTROPHILS # BLD AUTO: 6.6 X10(3)/MCL (ref 1.56–6.13)
NEUTROPHILS NFR BLD AUTO: 75.6 % (ref 37–73)
NRBC BLD AUTO-RTO: 0 % (ref 0–1)
PLATELET # BLD AUTO: 147 X10(3)/MCL (ref 140–371)
PMV BLD AUTO: 9.3 FL (ref 9.4–12.4)
RBC # BLD AUTO: 3.25 X10(6)/MCL (ref 4–5.1)
WBC # SPEC AUTO: 8.7 X10(3)/MCL (ref 4–11.5)

## 2023-01-28 PROCEDURE — 85025 COMPLETE CBC W/AUTO DIFF WBC: CPT | Performed by: OBSTETRICS & GYNECOLOGY

## 2023-01-28 PROCEDURE — 25000003 PHARM REV CODE 250: Performed by: OBSTETRICS & GYNECOLOGY

## 2023-01-28 RX ORDER — IRON,CARBONYL/ASCORBIC ACID 100-250 MG
1 TABLET ORAL DAILY
Qty: 30 TABLET | Refills: 0 | Status: SHIPPED | OUTPATIENT
Start: 2023-01-29 | End: 2023-02-28

## 2023-01-28 RX ORDER — HYDROCODONE BITARTRATE AND ACETAMINOPHEN 5; 325 MG/1; MG/1
1 TABLET ORAL EVERY 6 HOURS PRN
Qty: 12 TABLET | Refills: 0 | Status: SHIPPED | OUTPATIENT
Start: 2023-01-28 | End: 2023-02-09

## 2023-01-28 RX ORDER — IBUPROFEN 600 MG/1
600 TABLET ORAL EVERY 6 HOURS PRN
Qty: 30 TABLET | Refills: 0 | Status: SHIPPED | OUTPATIENT
Start: 2023-01-28 | End: 2023-02-27

## 2023-01-28 RX ORDER — IRON,CARBONYL/ASCORBIC ACID 100-250 MG
1 TABLET ORAL DAILY
Status: DISCONTINUED | OUTPATIENT
Start: 2023-01-28 | End: 2023-01-28 | Stop reason: HOSPADM

## 2023-01-28 RX ORDER — IRON,CARBONYL/ASCORBIC ACID 100-250 MG
1 TABLET ORAL DAILY
Qty: 30 TABLET | Refills: 0 | Status: SHIPPED | OUTPATIENT
Start: 2023-01-29 | End: 2023-01-28 | Stop reason: SDUPTHER

## 2023-01-28 RX ORDER — HYDROCODONE BITARTRATE AND ACETAMINOPHEN 5; 325 MG/1; MG/1
1 TABLET ORAL EVERY 6 HOURS PRN
Qty: 12 TABLET | Refills: 0 | Status: SHIPPED | OUTPATIENT
Start: 2023-01-28 | End: 2023-01-28 | Stop reason: SDUPTHER

## 2023-01-28 RX ORDER — IBUPROFEN 600 MG/1
600 TABLET ORAL EVERY 6 HOURS PRN
Qty: 30 TABLET | Refills: 0 | Status: SHIPPED | OUTPATIENT
Start: 2023-01-28 | End: 2023-01-28 | Stop reason: SDUPTHER

## 2023-01-28 RX ADMIN — HYDROCODONE BITARTRATE AND ACETAMINOPHEN 1 TABLET: 5; 325 TABLET ORAL at 05:01

## 2023-01-28 RX ADMIN — Medication 1 TABLET: at 10:01

## 2023-01-28 RX ADMIN — PRENATAL VITAMINS-IRON FUMARATE 27 MG IRON-FOLIC ACID 0.8 MG TABLET 1 TABLET: at 10:01

## 2023-01-28 RX ADMIN — HYDROCODONE BITARTRATE AND ACETAMINOPHEN 1 TABLET: 5; 325 TABLET ORAL at 10:01

## 2023-01-28 RX ADMIN — HYDROCODONE BITARTRATE AND ACETAMINOPHEN 1 TABLET: 5; 325 TABLET ORAL at 04:01

## 2023-01-28 RX ADMIN — IBUPROFEN 600 MG: 600 TABLET, FILM COATED ORAL at 10:01

## 2023-01-28 NOTE — PLAN OF CARE
PT AMBULATING WITHOUT ASSISTANCE, ABLE TO SHOWER AND PROVIDE PERINEAL CARE INDEPENDENTLY. PAIN WELL CONTROLLED. BONDING APPROPRIATELY WITH INFANT.

## 2023-01-28 NOTE — DISCHARGE SUMMARY
[facility]  Obstetrics  Discharge Summary      Patient Name: Hayde Mejía  MRN: [id number]  Admission Date: 2023  Hospital Length of Stay: 1 days  Discharge Date and Time:  2023 12:22 PM  Attending Physician: Juan Carlos Galeas MD   Discharging Provider: JUAN CARLOS GALEAS MD  Primary Care Provider: Primary Doctor No    HPI: S/P  on 23    Hospital Course: Patient is doing well today, ambulating and voiding without difficulties. Pain is well controlled with oral pain medications. She denies any excessive vaginal bleeding or unequal lower extremity swelling. Patient is bottle feeding. Dr Bartholomew rounded on the infant earlier this morning and will allow the infant to be discharged after 5pm.     Final Active Diagnoses:    Diagnosis Date Noted POA    PRINCIPAL PROBLEM:  Normal pregnancy [Z34.90] 2023 Not Applicable    Delivery normal [O80] 2023 Not Applicable      Problems Resolved During this Admission:        Feeding Method: bottle    Immunizations       None            Delivery:    Episiotomy: Median   Lacerations: None   Repair suture:     Repair # of packets: 2   Blood loss (ml):       Birth information:  YOB: 2022   Time of birth: 5:00 PM   Sex: male   Delivery type: Vaginal, Spontaneous   Gestational Age: 39w3d    Delivery Clinician:      Other providers:       Additional  information:  Forceps:    Vacuum:    Breech:    Observed anomalies      Living?:           APGARS  One minute Five minutes Ten minutes   Skin color:         Heart rate:         Grimace:         Muscle tone:         Breathing:         Totals: 9  9        Placenta: Delivered:       appearance  Pending Diagnostic Studies:       None            Discharged Condition: stable    Disposition: Home or Self Care    Follow Up:   Follow-up Information       JUAN CARLOS GALEAS MD. Schedule an appointment as soon as possible for a visit in 1 month(s).    Specialty: Obstetrics and  Gynecology  Why: for post partum exam  Contact information:  [address] [place] [address]  [place] [address]  [phone]                           Patient Instructions:      Diet Adult Regular     Pelvic Rest   Standing Status: Future Standing Exp. Date: 02/28/23     Notify your health care provider if you experience any of the following:  temperature >100.4     Notify your health care provider if you experience any of the following:  persistent nausea and vomiting or diarrhea     Notify your health care provider if you experience any of the following:  severe uncontrolled pain     Notify your health care provider if you experience any of the following:  redness, tenderness, or signs of infection (pain, swelling, redness, odor or green/yellow discharge around incision site)     Notify your health care provider if you experience any of the following:  difficulty breathing or increased cough     Notify your health care provider if you experience any of the following:  persistent dizziness, light-headedness, or visual disturbances     Activity as tolerated     Shower on day dressing removed (No bath)   Standing Status: Future Standing Exp. Date: 02/11/23     Medications:  Current Discharge Medication List        START taking these medications    Details   HYDROcodone-acetaminophen (NORCO) 5-325 mg per tablet Take 1 tablet by mouth every 6 (six) hours as needed for Pain.  Qty: 12 tablet, Refills: 0    Comments: Quantity prescribed more than 7 day supply? No      ibuprofen (ADVIL,MOTRIN) 600 MG tablet Take 1 tablet (600 mg total) by mouth every 6 (six) hours as needed for Pain (cramping).  Qty: 30 tablet, Refills: 0      iron-vitamin C 100-250 mg, ICAR-C, 100-250 mg Tab Take 1 tablet by mouth once daily.  Qty: 30 tablet, Refills: 0           CONTINUE these medications which have NOT CHANGED    Details   docosahexaenoic acid (PRENATAL DHA ORAL) Take by mouth.             NICO BUTT MD  Obstetrics  [facility]

## 2023-01-28 NOTE — L&D DELIVERY NOTE
"[facility]  Vaginal Delivery   Operative Note    SUMMARY     Normal spontaneous vaginal delivery of live infant, was placed on mothers abdomen for skin to skin and bulb suctioning performed.  Infant delivered position VEGA with episiotomy done for vertex to deliver without tearing . Episiotomy did not extend. It was repaired in the usual fashion with 2-0 Chromic on CT-needle. There was no extension.   Nuchal cord: No.  Spontaneous delivery of placenta and IV pitocin given noting good uterine tone.  No lacerations noted.  Patient tolerated delivery well. Sponge needle and lap counted correctly x2.    Indications: Normal pregnancy  Pregnancy complicated by:   Patient Active Problem List   Diagnosis    Pregnancy    Normal pregnancy    Delivery normal     Admitting GA: 39w3d    Delivery Information for Rd Mejía    Birth information:  YOB: 2022   Time of birth: 5:00 PM   Sex: male   Head Delivery Date/Time: 1/27/2023  5:00 PM   Delivery type: Vaginal, Spontaneous   Gestational Age: 39w3d    Delivery Providers    Delivering clinician: Juan Carlos Galeas MD           Measurements    Weight: 3515 g  Weight (lbs): 7 lb 12 oz  Length: 52.1 cm  Length (in): 20.5"  Head circumference: 13.5 cm         Apgars    Living status: Living  Apgars:  1 min.:  5 min.:  10 min.:  15 min.:  20 min.:    Skin color:  1  1       Heart rate:  2  2       Reflex irritability:  2  2       Muscle tone:  2  2       Respiratory effort:  2  2       Total:  9  9       Apgars assigned by: DARLENE SIMENTAL         Operative Delivery    Forceps attempted?: No  Vacuum extractor attempted?: No         Shoulder Dystocia    Shoulder dystocia present?: No           Presentation    Presentation: Vertex  Position: Left Occiput Anterior           Interventions/Resuscitation    Method: Bulb Suctioning, Tactile Stimulation       Cord    Vessels: 3 vessels  Complications: None  Delayed Cord Clamping?: Yes  Cord " Clamped Date/Time: 2023  5:52 PM  Cord Blood Disposition: Lab  Gases Sent?: No  Stem Cell Collection (by MD): No       Placenta    Placenta delivery date/time: 2023 1703  Placenta removal: Spontaneous  Placenta appearance: Intact  Placenta disposition: discarded           Labor Events:       labor: No     Labor Onset Date/Time: 2023 13:15     Dilation Complete Date/Time: 2023 16:45     Start Pushing Date/Time: 2023 16:50     Rupture Date/Time: 23  1315         Rupture type: ARM (Artificial Rupture)           Fluid Amount:         Fluid Color: Yellow         steroids: None     Antibiotics given for GBS: No     Induction: oxytocin     Indications for induction:  Elective     Augmentation: oxytocin     Indications for augmentation:       Labor complications: None     Additional complications:          Cervical ripening:                     Delivery:      Episiotomy: Median     Indication for Episiotomy:       Perineal Lacerations: None Repaired:      Periurethral Laceration:   Repaired:     Labial Laceration:   Repaired:     Sulcus Laceration:   Repaired:     Vaginal Laceration:   Repaired:     Cervical Laceration:   Repaired:     Repair suture:       Repair # of packets: 2     Last Value - EBL - Nursing (mL):       Sum - EBL - Nursing (mL): 0     Last Value - EBL - Anesthesia (mL):        Calculated QBL (mL):         Vaginal Sweep Performed: Yes     Surgicount Correct: Yes       Other providers:       Anesthesia    Method: Epidural          Details (if applicable):  Trial of Labor      Categorization:      Priority:     Indications for :     Incision Type:       Additional  information:  Forceps:    Vacuum:    Breech:    Observed anomalies    Other (Comments):

## 2023-03-01 ENCOUNTER — POSTPARTUM VISIT (OUTPATIENT)
Dept: OBSTETRICS AND GYNECOLOGY | Facility: CLINIC | Age: 33
End: 2023-03-01
Payer: COMMERCIAL

## 2023-03-01 VITALS
HEART RATE: 78 BPM | SYSTOLIC BLOOD PRESSURE: 110 MMHG | WEIGHT: 169 LBS | HEIGHT: 64 IN | BODY MASS INDEX: 28.85 KG/M2 | DIASTOLIC BLOOD PRESSURE: 72 MMHG

## 2023-03-01 PROCEDURE — 0503F PR POSTPARTUM CARE VISIT: ICD-10-PCS | Mod: CPTII,,, | Performed by: NURSE PRACTITIONER

## 2023-03-01 PROCEDURE — 0503F POSTPARTUM CARE VISIT: CPT | Mod: CPTII,,, | Performed by: NURSE PRACTITIONER

## 2023-03-01 RX ORDER — NORETHINDRONE ACETATE AND ETHINYL ESTRADIOL, ETHINYL ESTRADIOL AND FERROUS FUMARATE 1MG-10(24)
1 KIT ORAL DAILY
Qty: 28 TABLET | Refills: 5 | Status: SHIPPED | OUTPATIENT
Start: 2023-03-01 | End: 2023-08-15 | Stop reason: SDUPTHER

## 2023-03-01 RX ORDER — MAGNESIUM HYDROXIDE 400 MG/5ML
SUSPENSION, ORAL (FINAL DOSE FORM) ORAL
COMMUNITY
Start: 2022-05-25

## 2023-03-01 RX ORDER — FLUOXETINE HYDROCHLORIDE 20 MG/1
20 CAPSULE ORAL
COMMUNITY
Start: 2023-02-03

## 2023-03-01 RX ORDER — DIETHYLPROPION HYDROCHLORIDE 75 MG/1
75 TABLET, EXTENDED RELEASE ORAL
COMMUNITY
Start: 2023-02-03

## 2023-03-01 NOTE — PROGRESS NOTES
"  Subjective:      Hayde Mejía is a 32 y.o.  who presents for  Postpartum Care 4 wk . Bottle feeding going well. Denies any pain, states still bleeding, had slowed down. Denies any SI/HI. Wants to discuss birth control. Her last pap was on 2022       Past Medical History:   Diagnosis Date    Interstitial cystitis      Past Surgical History:   Procedure Laterality Date    DILATION AND CURETTAGE OF UTERUS      INSTILLATION OF HEPARIN INTO BLADDER FOR INTERSTITIAL CYSTITIS      WISDOM TOOTH EXTRACTION       Social History     Tobacco Use    Smoking status: Never     Passive exposure: Never    Smokeless tobacco: Never   Substance Use Topics    Alcohol use: Not Currently    Drug use: Never     Family History   Problem Relation Age of Onset    Diabetes Father     Hypertension Mother      OB History    Para Term  AB Living   4 2 2   1 2   SAB IAB Ectopic Multiple Live Births           2      # Outcome Date GA Lbr Rich/2nd Weight Sex Delivery Anes PTL Lv   4 Term 23 39w5d  3.515 kg (7 lb 12 oz) M Vag-Spont EPI  JUAN MANUEL   3 AB 21 13w0d    SAB   FD   2 Term 18 39w4d  3.487 kg (7 lb 11 oz) F Vag-Spont EPI N JUAN MANUEL   1                  Review the Delivery Report for details.     ROS:   Review of Systems  A comprehensive review of symptoms was completed and negative except as noted above.      Objective:     /72   Pulse 78   Ht 5' 4" (1.626 m)   Wt 76.7 kg (169 lb)   LMP 2022   Breastfeeding No   BMI 29.01 kg/m²   Constitutional:  General Appearance : alert, in no acute distress, normal, well nourished.  Genitourinary:  External Genitalia: normal, no lesions.  Vagina: normal appearance, no abnormal discharge, no lesions.  Bladder: no mass, nontender.  Urethra: no erythema or lesions present.  Cervix: no lesions, non tender. Pap N/A  Uterus: nontender, normal contour, normal mobility, normal size.   Adnexa: no masses, no tenderness.  Anus and Perineum: visually " normal.   Chaperone Present  Assessment:     Encounter for postpartum visit    Other orders  -     norethindrone-e.estradioL-iron (LO LOESTRIN FE) 1 mg-10 mcg (24)/10 mcg (2) Tab; Take 1 tablet by mouth Daily.  Dispense: 28 tablet; Refill: 5        Plan:     Return to clinic For annual exam. In addition, the patient has also been instructed to follow up on as needed basis. All questions were answered and the patient voiced understanding of the above issues.

## 2023-08-15 ENCOUNTER — TELEPHONE (OUTPATIENT)
Dept: OBSTETRICS AND GYNECOLOGY | Facility: CLINIC | Age: 33
End: 2023-08-15
Payer: COMMERCIAL

## 2023-08-15 DIAGNOSIS — Z30.9 ENCOUNTER FOR CONTRACEPTIVE MANAGEMENT, UNSPECIFIED TYPE: Primary | ICD-10-CM

## 2023-08-15 RX ORDER — NORETHINDRONE ACETATE AND ETHINYL ESTRADIOL, ETHINYL ESTRADIOL AND FERROUS FUMARATE 1MG-10(24)
1 KIT ORAL DAILY
Qty: 28 TABLET | Refills: 5 | Status: SHIPPED | OUTPATIENT
Start: 2023-08-15 | End: 2024-02-06 | Stop reason: SDUPTHER

## 2023-08-15 NOTE — TELEPHONE ENCOUNTER
----- Message from Korina Hammonds sent at 8/15/2023  1:05 PM CDT -----  Regarding: RX REFILL  PT LEFT A MESSAGE WITH THE ANSWERING SERVICE SAYING THAT SHE NEEDED A REFILL. DID NOT SAY WHICH MEDICATION.

## 2024-02-06 ENCOUNTER — TELEPHONE (OUTPATIENT)
Dept: OBSTETRICS AND GYNECOLOGY | Facility: CLINIC | Age: 34
End: 2024-02-06
Payer: COMMERCIAL

## 2024-02-06 DIAGNOSIS — Z30.9 ENCOUNTER FOR CONTRACEPTIVE MANAGEMENT, UNSPECIFIED TYPE: ICD-10-CM

## 2024-02-06 RX ORDER — NORETHINDRONE ACETATE AND ETHINYL ESTRADIOL, ETHINYL ESTRADIOL AND FERROUS FUMARATE 1MG-10(24)
1 KIT ORAL DAILY
Qty: 28 TABLET | Refills: 5 | Status: SHIPPED | OUTPATIENT
Start: 2024-02-06 | End: 2024-03-07 | Stop reason: CLARIF

## 2024-02-06 NOTE — TELEPHONE ENCOUNTER
Med sent to pharmacy----- Message from Korina Hammonds sent at 2/6/2024 11:08 AM CST -----  Regarding: rx refill  Pt needs a bc refill sent to her pharmacy.

## 2024-03-07 DIAGNOSIS — Z30.9 ENCOUNTER FOR CONTRACEPTIVE MANAGEMENT, UNSPECIFIED TYPE: Primary | ICD-10-CM

## 2024-03-07 RX ORDER — NORETHINDRONE ACETATE AND ETHINYL ESTRADIOL 1MG-20(24)
1 KIT ORAL DAILY
Qty: 28 TABLET | Refills: 5 | Status: SHIPPED | OUTPATIENT
Start: 2024-03-07

## 2024-08-20 ENCOUNTER — TELEPHONE (OUTPATIENT)
Dept: OBSTETRICS AND GYNECOLOGY | Facility: CLINIC | Age: 34
End: 2024-08-20
Payer: COMMERCIAL

## 2024-08-20 DIAGNOSIS — Z30.9 ENCOUNTER FOR CONTRACEPTIVE MANAGEMENT, UNSPECIFIED TYPE: ICD-10-CM

## 2024-08-20 RX ORDER — NORETHINDRONE ACETATE AND ETHINYL ESTRADIOL 1MG-20(24)
1 KIT ORAL DAILY
Qty: 28 TABLET | Refills: 5 | Status: SHIPPED | OUTPATIENT
Start: 2024-08-20

## 2024-08-20 NOTE — TELEPHONE ENCOUNTER
Returned patient call, jung confirmed, informed patient will refill blisovi 24  one time until appt. Appt schedule verbalized understanding----- Message from Korina Hammonds CMA sent at 2024  9:04 AM CDT -----  Regarding: RX REFILL  Pt is due for her annual but called for a refill on her bc. Scheduled pt an appt and told her that we would could give her one until she comes in since she has been due since.

## 2024-08-26 ENCOUNTER — OFFICE VISIT (OUTPATIENT)
Dept: OBSTETRICS AND GYNECOLOGY | Facility: CLINIC | Age: 34
End: 2024-08-26
Payer: COMMERCIAL

## 2024-08-26 VITALS
WEIGHT: 148.19 LBS | TEMPERATURE: 97 F | HEIGHT: 64 IN | DIASTOLIC BLOOD PRESSURE: 74 MMHG | SYSTOLIC BLOOD PRESSURE: 122 MMHG | BODY MASS INDEX: 25.3 KG/M2

## 2024-08-26 DIAGNOSIS — Z01.419 WELL WOMAN EXAM WITH ROUTINE GYNECOLOGICAL EXAM: Primary | ICD-10-CM

## 2024-08-26 DIAGNOSIS — Z12.4 CERVICAL CANCER SCREENING: ICD-10-CM

## 2024-08-26 DIAGNOSIS — Z79.3 AMENORRHEA DUE TO ORAL CONTRACEPTIVE: ICD-10-CM

## 2024-08-26 DIAGNOSIS — Z30.41 ORAL CONTRACEPTIVE USE: ICD-10-CM

## 2024-08-26 DIAGNOSIS — Z80.3 FAMILY HISTORY OF BREAST CANCER: ICD-10-CM

## 2024-08-26 DIAGNOSIS — N91.2 AMENORRHEA DUE TO ORAL CONTRACEPTIVE: ICD-10-CM

## 2024-08-26 PROCEDURE — 1159F MED LIST DOCD IN RCRD: CPT | Mod: CPTII,,,

## 2024-08-26 PROCEDURE — 3074F SYST BP LT 130 MM HG: CPT | Mod: CPTII,,,

## 2024-08-26 PROCEDURE — 3078F DIAST BP <80 MM HG: CPT | Mod: CPTII,,,

## 2024-08-26 PROCEDURE — 3008F BODY MASS INDEX DOCD: CPT | Mod: CPTII,,,

## 2024-08-26 PROCEDURE — 1160F RVW MEDS BY RX/DR IN RCRD: CPT | Mod: CPTII,,,

## 2024-08-26 PROCEDURE — 87624 HPV HI-RISK TYP POOLED RSLT: CPT

## 2024-08-26 PROCEDURE — 99395 PREV VISIT EST AGE 18-39: CPT | Mod: ,,,

## 2024-08-26 RX ORDER — NORETHINDRONE ACETATE AND ETHINYL ESTRADIOL 1MG-20(24)
1 KIT ORAL DAILY
Qty: 28 TABLET | Refills: 11 | Status: SHIPPED | OUTPATIENT
Start: 2024-08-26

## 2024-08-26 NOTE — PROGRESS NOTES
Chief Complaint:   Well Woman (Annual gyn exam)     History of Present Illness:  Hayde Mejía is a 33 y.o. year old  presents for her well woman exam. Currently relying on OCP for birth control. Silent menses with OCP. Denies any irregular menstrual bleeding. Denies any complaints today. Needs refills on OCP.      Gyn History:  Menstrual History  Cycle: No  Menarche Age: 13 years  No Cycle Reason: Other  Other Reason: silent menses w/ birth control    Menopause  Menopause Age: 1 years    Pap History  Last pap date: 22  Result: Normal  History of Abnormal Pap: No  HPV Vaccine Completed: No    [place]  Sexually Active: Yes  Sexual Orientation: heterosexual  Postcoital Bleeding: No  Dyspareunia: No  STI History: No  Contraception: Yes (Blisovi )  Contraception Type: Oral contraceptives    Breast History  Last Breast Imaging Date: No  History of Breast Biopsy: No        Review of Systems:  General/Constitutional: Chills denies. Fatigue/weakness denies. Fever denies. Night sweats denies. Hot flashes denies    Respiratory: Cough denies. Hemoptysis denies. SOB denies. Sputum production denies. Wheezing denies .   Cardiovascular: Chest pain denies. Dizziness denies. Palpitations denies. Swelling in hands/feet denies.                Breast: Dimpling denies. Breast mass denies. Breast pain/tenderness denies. Nipple discharge denies. Puckering denies.  Gastrointestinal: Abdominal pain denies. Blood in stool denies. Constipation denies. Diarrhea denies. Heartburn denies. Nausea denies. Vomiting denies    Genitourinary: Incontinence denies. Blood in urine denies. Frequent urination denies. Painful urination denies. Urinary urgency denies. Nocturia denies    Gynecologic: Irregular menses denies. Heavy bleeding denies. Painful menses denies. Vaginal discharge denies. Vaginal odor denies. Vaginal itching denies. Vaginal lesion denies. Pelvic pain denies. Decreased libido denies. Vulvar lesion denies.  Prolapse of genital organs denies. Painful intercourse denies. Postcoital bleeding denies    Psychiatric: Depression denies. Anxiety denies.     OB History    Para Term  AB Living   3 2 2 0 1 2   SAB IAB Ectopic Multiple Live Births   0 0 0 0 2      # 1 - Date: 19, Sex: Female, Weight: 3.487 kg (7 lb 11 oz), GA: 39w4d, Type: Vaginal, Spontaneous, Apgar1: None, Apgar5: None, Living: Living, Birth Comments: None    # 2 - Date: 22, Sex: None, Weight: None, GA: 13w0d, Type: Spontaneous , Apgar1: None, Apgar5: None, Living: Fetal Demise, Birth Comments: None    # 3 - Date: 24, Sex: Male, Weight: 3.515 kg (7 lb 12 oz), GA: 39w5d, Type: Vaginal, Spontaneous, Apgar1: None, Apgar5: None, Living: Living, Birth Comments: None      Past Medical History:   Diagnosis Date    Interstitial cystitis        Current Outpatient Medications:     FLUoxetine 20 MG capsule, Take 20 mg by mouth., Disp: , Rfl:     norethindrone-e.estradioL-iron (BLISOVI 24 FE) 1 mg-20 mcg (24)/75 mg (4) per tablet, Take 1 tablet by mouth once daily., Disp: 28 tablet, Rfl: 11    Review of patient's allergies indicates:  No Known Allergies    Past Surgical History:   Procedure Laterality Date    DILATION AND CURETTAGE OF UTERUS      INSTILLATION OF HEPARIN INTO BLADDER FOR INTERSTITIAL CYSTITIS      WISDOM TOOTH EXTRACTION       Family History   Problem Relation Name Age of Onset    Diabetes Father      Hypertension Mother      Breast cancer Maternal Aunt      Colon cancer Neg Hx      Ovarian cancer Neg Hx      Uterine cancer Neg Hx      Cervical cancer Neg Hx       Social History     Socioeconomic History    Marital status:    Tobacco Use    Smoking status: Never     Passive exposure: Never    Smokeless tobacco: Never   Substance and Sexual Activity    Alcohol use: Not Currently    Drug use: Never    Sexual activity: Yes     Partners: Male     Birth control/protection: OCP     Comment: Birth control pill  "      Physical Exam:  /74 (BP Location: Right arm, Patient Position: Sitting, BP Method: Medium (Manual))   Temp 97.3 °F (36.3 °C) (Temporal)   Ht 5' 4" (1.626 m)   Wt 67.2 kg (148 lb 3.2 oz)   BMI 25.44 kg/m²     Chaperone: present.       General appearance: healthy, well-nourished and well-developed     Psychiatric: Orientation to time, place and person. Normal mood and affect and active, alert     Skin:   Appearance: no rashes or lesions.     Neck:   Neck: supple, FROM, trachea midline. and no masses   Thyroid: no enlargement or nodules and non-tender.       Cardiovascular:   Auscultation: RRR and no murmur.   Peripheral Vascular: no varicosities, LLE edema, RLE edema, calf tenderness, and palpable cord and pedal pulses intact.     Lungs:   Respiratory effort: no intercostal retractions or accessory muscle usage.   Auscultation: no wheezing, rales/crackles, or rhonchi and clear to auscultation.     Breast:   Inspection/Palpation: no tenderness, discrete/distinct masses, skin changes, or abnormal secretions. Nipple appearance normal.     Abdomen:   Auscultation/Inspection/Palpation: no hepatomegaly, splenomegaly, masses, tenderness or CVA tenderness and soft, non-distended bowel sounds preset.    Hernia: no palpable hernias.     Female Genitalia:   Vulva: no masses, tenderness or lesions   Bladder/Urethra: no urethral discharge or mass, normal meatus, bladder non-distended.   Vagina: no tenderness, erythema, cystocele, rectocele, abnormal vaginal discharge or vesicle(s) or ulcers   Cervix: no discharge, no cervical lacerations noted or motion tenderness and grossly normal   Uterus: normal size and shape and midline, non-tender, and no uterine prolapse.   Adnexa/Parametria: no parametrial tenderness or mass, no adnexal tenderness or ovarian mass.     Lymph Nodes:   Palpation: non tender submandibular nodes, axillary nodes, or inguinal nodes.     Rectal Exam:   Rectum: normal perianal skin.  "       Assessment/Plan:  1. Well woman exam with routine gynecological exam  Pap  Recommend BSE monthly  Discussed recommendations of annual screening after age of 40 with mammogram    Explained that screening is not 100% reliable. Advised patient if she notices any changes to her breast including a lump, mass, dimpling, discharge, rash, or tenderness she should contact us immediately.     Healthy diet, exercise   Multivitamin   Seat belt   Sunscreen use   Safe sex/ STI education   Contraception evaluation: OCP   Gardasil evaluation: 0/3    2. Oral contraceptive use  -     norethindrone-e.estradioL-iron (BLISOVI 24 FE) 1 mg-20 mcg (24)/75 mg (4) per tablet; Take 1 tablet by mouth once daily.  Dispense: 28 tablet; Refill: 11    Discussed with patient contraceptive options including natural family planning, barrier, oral contraceptives, patch, NuvaRing, Depo-Provera, Nexplanon, IUDs, abstinence.       Safe sex education     Discussed with patient that birth control options discussed above do not protect against STDs.    Patient desires to continue OCP.    3. Amenorrhea due to oral contraceptive    4. Family history of breast cancer  -Encouraged SBE    5. Cervical cancer screening      RTC PRN or for annual.

## 2024-08-29 LAB
HIGH RISK HPV: NEGATIVE
PSYCHE PATHOLOGY RESULT: NORMAL